# Patient Record
Sex: FEMALE | Race: WHITE | Employment: FULL TIME | URBAN - METROPOLITAN AREA
[De-identification: names, ages, dates, MRNs, and addresses within clinical notes are randomized per-mention and may not be internally consistent; named-entity substitution may affect disease eponyms.]

---

## 2023-02-06 ENCOUNTER — MEDICAL CORRESPONDENCE (OUTPATIENT)
Dept: HEALTH INFORMATION MANAGEMENT | Facility: CLINIC | Age: 38
End: 2023-02-06
Payer: COMMERCIAL

## 2023-02-15 ENCOUNTER — TRANSCRIBE ORDERS (OUTPATIENT)
Dept: OTHER | Age: 38
End: 2023-02-15

## 2023-02-15 DIAGNOSIS — G35 MS (MULTIPLE SCLEROSIS) (H): Primary | ICD-10-CM

## 2023-03-20 ENCOUNTER — OFFICE VISIT (OUTPATIENT)
Dept: NEUROLOGY | Facility: CLINIC | Age: 38
End: 2023-03-20
Payer: COMMERCIAL

## 2023-03-20 VITALS — DIASTOLIC BLOOD PRESSURE: 69 MMHG | SYSTOLIC BLOOD PRESSURE: 109 MMHG | HEART RATE: 76 BPM

## 2023-03-20 DIAGNOSIS — G35 MS (MULTIPLE SCLEROSIS) (H): Primary | ICD-10-CM

## 2023-03-20 DIAGNOSIS — E55.9 VITAMIN D DEFICIENCY: ICD-10-CM

## 2023-03-20 PROCEDURE — 99213 OFFICE O/P EST LOW 20 MIN: CPT | Mod: GC | Performed by: PSYCHIATRY & NEUROLOGY

## 2023-03-20 RX ORDER — DIMETHYL FUMARATE 240 MG/1
1 CAPSULE ORAL 2 TIMES DAILY
COMMUNITY
Start: 2013-02-19 | End: 2024-04-08

## 2023-03-20 RX ORDER — ASPIRIN 81 MG/1
81 TABLET, CHEWABLE ORAL 2 TIMES DAILY
COMMUNITY
Start: 2010-02-19

## 2023-03-20 RX ORDER — PERPHENAZINE 16 MG
TABLET ORAL DAILY
COMMUNITY
End: 2024-04-08

## 2023-03-20 NOTE — NURSING NOTE
Chief Complaint   Patient presents with     MS     Referred by TRICIA Kilgore on 3/20/2023 at 8:10 AM

## 2023-03-20 NOTE — PATIENT INSTRUCTIONS
"Overall you are doing very well with MS     The episode of left arm symptoms could have been a \"pseudo relapse\" a symptom brought on by stress  Your exam still looks great - concern for new lesion is not high     Blood work in June   MRI at that time as well     See me after MRI   "

## 2023-03-20 NOTE — PROGRESS NOTES
Department of Neurology  Multiple Sclerosis   New Patient Visit    Patient: Rita Hardin   MRN: [id number]   : 1985   Date of Visit: 3/20/2023    Chief Complaint: Multiple sclerosis    HPI:  Malini Hardin is a 37-year-old woman with multiple sclerosis who presents to clinic to establish care.    She first developed symptoms in Summer 2008 of tinging in bilateral hands and was ultimately diagnosed in 2008. Since that time, she has not had relapses. Previously, she was followed at [facility] and most recently evaluated in that clinic in 2022.    A couple months ago, she developed chest pain/tightness and unilateral numbness in left hand. This was sudden onset and resolved within 24-36 hours. She went for evaluation and providers evaluated her for cardiac etiologies, but those returned negative. Providers felt that this episode was related to her MS. She notes that she experienced associated fatigue and that the symptoms occurred during a stressful time. She has been following with her PCP to evaluate this and had a cardiac stress test recently.    She has noticed that she has been focusing more on balance. She had one fall a few days ago where she was was at top of stairsm rug moved as she was walking, and fell down some stairs. She did not hit her head or lose consciousness. About one month ago, she fell while shoveling after slipping on ice.    She does not have bowel or bladder incontinence.    Sh is taking dimethyl fumerate, maybe occasionally misses a dose (1-2 times per month). She takes aspirin for flushing associated with the dimethylfumerate. JCV indeterminate in 2022.     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   Last relapse: age 23    DMD History:  Betaseron 2516-9526 (elevated LFTs)  Copaxone/glatiramer acetate 8103-9078 (radiologic progression)  Tecfidera 0690-5903 (flushing, insurance coverage & switched  to generic form)  Dimethyl fumerate (11/2020 to present)    Last evaluated at [facility] on 12/05/2022 by Dr. Saldaña    ROS:  All others negative except as listed above.    PMH/PSH:  Multiple sclerosis  Widsom tooth extraction    Current Medications:  Vitamin D3  Asa  Dimethyl fumerate    Allergies:  No Known Allergies     Family History:  Dad had multiple sclerosis, passed at age 70 with respiratory failure  Mom has leukemia    Social History:  Lives with partner of 6 years. 2 cats, 2 dogs, fish.  Tobacco: none,   Alcohol: rare.  Recreational drugs: none     PHYSICAL EXAM:  /69 (BP Location: Right arm, Patient Position: Sitting)   Pulse 76     Gen: alert, active, attentive, appropriately groomed   HEENT: normocephalic, eyes open with no discharge, nares patent, oropharynx clear.  Pulm: normal configuration, chest rise equal b/l, non labored breathing   CV: extremities appear appropriately perfused  Pulmonary: breathing comfortably on room air, no wheezes/crackles/rales  Extremities: no clubbing/edema/cyanosis in BUE/BLE    NEURO:  MS: Alert and attentive. Oriented to person, place, time, and situation. Follows simple commands, naming intact.  Recent and remote memory intact.      CN:  II: Pupils round and reactive.   III, IV, VI: EOM intact, buries sclera, no nystagmus.   V:  Facial sensation intact and symmetric along V1-V3  VII: Symmetric facial movements  VIII: Intact to conversation.  IX, X: Equal palate rise, uvula midline.  No dysarthria, normal tone.  XI: Shoulder shrug intact bilaterally.  XII: Tongue protrudes midline. No atrophy.    Motor:  Normal bulk and tone throughout upper and lower extremities.  No abnormal movements or fasciculations observed.      Right Left   Shoulder abduction 5 5   Elbow flexion 5 5   Elbow extension 5 5   Wrist extension 5 5   Wrist flexion 5 5   Finger extension 5 5   Hip flexion 5 5   Knee extension 5 5   Knee flexion 5 5   Ankle plantarflexion 5 5   Ankle  dorsiflexion 5 5      Reflexes:     Right Left   Biceps Brisk Brisk   Brachioradialis Brisk Brisk   Triceps Brisk Brisk   Traore     Patellar Brisk Brisk   Achilles Brisk Brisk   Babinski Down Down     Sensation:     Right Left   Cotton/light touch Upper intact  Lower intact Upper intact  Lower intact   Proprioception Upper intact  Lower intact Upper intact  Lower intact   Vibration Upper >15 sec at 1st IP of thumb  Lower >15 sec at 1st IP joint of hallux Upper >15 sec at 1st IP of thumb  Lower >15 sec at 1st IP joint of hallux   Pinprick Upper intact  Lower intact Upper intact  Lower intact     Coordination:  FNF intact bilaterally.    Gait:  Tandem gait intact.  Able to walk on toes and heels.  Romberg negative.    LABS:    IMAGING:  Personally reviewed. The radiologist's interpretation is documented below.    MRI brain, C-, T-spine with and without contrast 12/2/2021 [facility]  IMPRESSION:   HEAD MRI:   1.  Moderate burden of chronic demyelination with supratentorial and infratentorial involvement appears relatively unchanged. This is accompanied by an unchanged mild diffuse parenchymal volume loss.   2.  No superimposed acute intracranial abnormality.     CERVICAL SPINE MRI:   1.  T2 hyperintense chronic demyelinating plaques in the cervical cord, described above, are unchanged in size, number, and overall appearance. No new cord lesions and no abnormal enhancement to suggest active demyelination.   2.  No high-grade spinal canal or neural foraminal stenosis.     THORACIC SPINE MRI:   1.  Unchanged T2 hyperintense cord lesion in the right hemicord at T4 with an unchanged subtle T2 hyperintense cord lesion in the left hemicord at T9-T10. No new cord lesion elsewhere and no abnormal cord enhancement.   2.  No high-grade spinal canal or neural foraminal stenosis.      ASSESSMENT & PLAN:  Rita is a 37-year-old woman who presents for evaluation of multiple sclerosis.     Neurologic examination is  non-focal and appears to be stable compared to prior exams. She is tolerating dimethyl fumerate well and rarely missing doses.     With regard to the episode of left arms sensory changes, it may be related to MS pseudoexcerbation. There are T2/FLAIR hyperintense lesions in the C-spine (right greater than left), which may account for her symptoms, but also note that there are fewer lesions on the left side.     We discussed the indeterminate JVC (0.38) from December 2022, which was obtained though [facility]. For individuals on dimethyl fumerate, the concern for PML raises significantly for low lymphocyte counts. Most recently, her absolute lymphocyte count was 1.8. Counseled continued monitoring.    Plan for brain MRI in June 2023 and continue vitamin D supplementation.    1. MRI brain, C-, T-spine with and without contrast in June 2023  2. JCV, CBC with differential, LFTs in June 2023  3. Continue dimethyl fumerate 240mg twice daily  4. Continue vitamin D 5000U daily  5. Follow-up in 3-4 months    The patient was seen and discussed with the attending neurologist, Dr. Whitt, who agrees with the assessment and plan.    Lisbeth Potts MD  Neurology PGY-4

## 2023-03-20 NOTE — LETTER
3/20/2023         RE: Rita Hardin  [address]  [place] [address]        Dear Colleague,    Thank you for referring your patient, Rita Hardin, to the [facility]OLOG[facility]. Please see a copy of my visit note below.    Department of Neurology  Multiple Sclerosis   New Patient Visit    Patient: Rita Hardin   MRN: [id number]   : 1985   Date of Visit: 3/20/2023    Chief Complaint: Multiple sclerosis    HPI:  Malini Hardin is a 37-year-old woman with multiple sclerosis who presents to clinic to establish care.    She first developed symptoms in Summer 2008 of tinging in bilateral hands and was ultimately diagnosed in 2008. Since that time, she has not had relapses. Previously, she was followed at [facility] and most recently evaluated in that clinic in 2022.    A couple months ago, she developed chest pain/tightness and unilateral numbness in left hand. This was sudden onset and resolved within 24-36 hours. She went for evaluation and providers evaluated her for cardiac etiologies, but those returned negative. Providers felt that this episode was related to her MS. She notes that she experienced associated fatigue and that the symptoms occurred during a stressful time. She has been following with her PCP to evaluate this and had a cardiac stress test recently.    She has noticed that she has been focusing more on balance. She had one fall a few days ago where she was was at top of stairsm rug moved as she was walking, and fell down some stairs. She did not hit her head or lose consciousness. About one month ago, she fell while shoveling after slipping on ice.    She does not have bowel or bladder incontinence.    Sh is taking dimethyl fumerate, maybe occasionally misses a dose (1-2 times per month). She takes aspirin for flushing associated with the dimethylfumerate. JCV indeterminate in 2022.     MS History:  Onset of  symptoms: age 23 years with sensory myelitis, tingling, and Lhermites.   Last relapse: age 23    DMD History:  Betaseron 2363-6218 (elevated LFTs)  Copaxone/glatiramer acetate 5414-1556 (radiologic progression)  Tecfidera 2279-6053 (flushing, insurance coverage & switched to generic form)  Dimethyl fumerate (11/2020 to present)    Last evaluated at [facility] on 12/05/2022 by Dr. Saldaña    ROS:  All others negative except as listed above.    PMH/PSH:  Multiple sclerosis  Widsom tooth extraction    Current Medications:  Vitamin D3  Asa  Dimethyl fumerate    Allergies:  No Known Allergies     Family History:  Dad had multiple sclerosis, passed at age 70 with respiratory failure  Mom has leukemia    Social History:  Lives with partner of 6 years. 2 cats, 2 dogs, fish.  Tobacco: none,   Alcohol: rare.  Recreational drugs: none     PHYSICAL EXAM:  /69 (BP Location: Right arm, Patient Position: Sitting)   Pulse 76     Gen: alert, active, attentive, appropriately groomed   HEENT: normocephalic, eyes open with no discharge, nares patent, oropharynx clear.  Pulm: normal configuration, chest rise equal b/l, non labored breathing   CV: extremities appear appropriately perfused  Pulmonary: breathing comfortably on room air, no wheezes/crackles/rales  Extremities: no clubbing/edema/cyanosis in BUE/BLE    NEURO:  MS: Alert and attentive. Oriented to person, place, time, and situation. Follows simple commands, naming intact.  Recent and remote memory intact.      CN:  II: Pupils round and reactive.   III, IV, VI: EOM intact, buries sclera, no nystagmus.   V:  Facial sensation intact and symmetric along V1-V3  VII: Symmetric facial movements  VIII: Intact to conversation.  IX, X: Equal palate rise, uvula midline.  No dysarthria, normal tone.  XI: Shoulder shrug intact bilaterally.  XII: Tongue protrudes midline. No atrophy.    Motor:  Normal bulk and tone throughout upper and lower extremities.  No abnormal movements or  fasciculations observed.      Right Left   Shoulder abduction 5 5   Elbow flexion 5 5   Elbow extension 5 5   Wrist extension 5 5   Wrist flexion 5 5   Finger extension 5 5   Hip flexion 5 5   Knee extension 5 5   Knee flexion 5 5   Ankle plantarflexion 5 5   Ankle dorsiflexion 5 5      Reflexes:     Right Left   Biceps Brisk Brisk   Brachioradialis Brisk Brisk   Triceps Brisk Brisk   Traore     Patellar Brisk Brisk   Achilles Brisk Brisk   Babinski Down Down     Sensation:     Right Left   Cotton/light touch Upper intact  Lower intact Upper intact  Lower intact   Proprioception Upper intact  Lower intact Upper intact  Lower intact   Vibration Upper >15 sec at 1st IP of thumb  Lower >15 sec at 1st IP joint of hallux Upper >15 sec at 1st IP of thumb  Lower >15 sec at 1st IP joint of hallux   Pinprick Upper intact  Lower intact Upper intact  Lower intact     Coordination:  FNF intact bilaterally.    Gait:  Tandem gait intact.  Able to walk on toes and heels.  Romberg negative.    LABS:    IMAGING:  Personally reviewed. The radiologist's interpretation is documented below.    MRI brain, C-, T-spine with and without contrast 12/2/2021 ([facility]):  IMPRESSION:   HEAD MRI:   1.  Moderate burden of chronic demyelination with supratentorial and infratentorial involvement appears relatively unchanged. This is accompanied by an unchanged mild diffuse parenchymal volume loss.   2.  No superimposed acute intracranial abnormality.     CERVICAL SPINE MRI:   1.  T2 hyperintense chronic demyelinating plaques in the cervical cord, described above, are unchanged in size, number, and overall appearance. No new cord lesions and no abnormal enhancement to suggest active demyelination.   2.  No high-grade spinal canal or neural foraminal stenosis.     THORACIC SPINE MRI:   1.  Unchanged T2 hyperintense cord lesion in the right hemicord at T4 with an unchanged subtle T2 hyperintense cord lesion in the left hemicord at T9-T10. No  new cord lesion elsewhere and no abnormal cord enhancement.   2.  No high-grade spinal canal or neural foraminal stenosis.      ASSESSMENT & PLAN:  Rita is a 37-year-old woman who presents for evaluation of multiple sclerosis.     Neurologic examination is non-focal and appears to be stable compared to prior exams. She is tolerating dimethyl fumerate well and rarely missing doses.     With regard to the episode of left arms sensory changes, it may be related to MS pseudoexcerbation. There are T2/FLAIR hyperintense lesions in the C-spine (right greater than left), which may account for her symptoms, but also note that there are fewer lesions on the left side.     We discussed the indeterminate JVC (0.38) from December 2022, which was obtained though [facility]. For individuals on dimethyl fumerate, the concern for PML raises significantly for low lymphocyte counts. Most recently, her absolute lymphocyte count was 1.8. Counseled continued monitoring.    Plan for brain MRI in June 2023 and continue vitamin D supplementation.    1. MRI brain, C-, T-spine with and without contrast in June 2023  2. JCV, CBC with differential, LFTs in June 2023  3. Continue dimethyl fumerate 240mg twice daily  4. Continue vitamin D 5000U daily  5. Follow-up in 3-4 months    The patient was seen and discussed with the attending neurologist, Dr. Whitt, who agrees with the assessment and plan.    Lisbeth Potts MD  Neurology PGY-4      Attestation signed by Teri Whitt MD at 3/20/2023  8:46 PM:  I personally saw and evaluated Ms. Hardin with Dr. Potts on the date of service.  I have reviewed the above documentation and agree with the findings and recommendations.     A total of 40 minutes were personally spent in the care of this patient on the date of service.     Teri Whitt MD on 3/20/2023 at 8:46 PM        Again, thank you for allowing me to participate in the care of your patient.         Sincerely,        Teri Whitt MD

## 2023-05-21 ENCOUNTER — HEALTH MAINTENANCE LETTER (OUTPATIENT)
Age: 38
End: 2023-05-21

## 2023-06-29 ENCOUNTER — LAB (OUTPATIENT)
Dept: LAB | Facility: HOSPITAL | Age: 38
End: 2023-06-29
Payer: COMMERCIAL

## 2023-06-29 DIAGNOSIS — G35 MS (MULTIPLE SCLEROSIS) (H): ICD-10-CM

## 2023-06-29 DIAGNOSIS — E55.9 VITAMIN D DEFICIENCY: ICD-10-CM

## 2023-06-29 LAB
ALBUMIN SERPL BCG-MCNC: 4.7 G/DL (ref 3.5–5.2)
ALP SERPL-CCNC: 51 U/L (ref 35–104)
ALT SERPL W P-5'-P-CCNC: 11 U/L (ref 0–50)
AST SERPL W P-5'-P-CCNC: 12 U/L (ref 0–45)
BASOPHILS # BLD AUTO: 0 10E3/UL (ref 0–0.2)
BASOPHILS NFR BLD AUTO: 0 %
BILIRUB DIRECT SERPL-MCNC: <0.2 MG/DL (ref 0–0.3)
BILIRUB SERPL-MCNC: 0.3 MG/DL
DEPRECATED CALCIDIOL+CALCIFEROL SERPL-MC: 46 UG/L (ref 20–75)
EOSINOPHIL # BLD AUTO: 0.1 10E3/UL (ref 0–0.7)
EOSINOPHIL NFR BLD AUTO: 1 %
ERYTHROCYTE [DISTWIDTH] IN BLOOD BY AUTOMATED COUNT: 11.8 % (ref 10–15)
HCT VFR BLD AUTO: 38.6 % (ref 35–47)
HGB BLD-MCNC: 13 G/DL (ref 11.7–15.7)
IMM GRANULOCYTES # BLD: 0 10E3/UL
IMM GRANULOCYTES NFR BLD: 0 %
LYMPHOCYTES # BLD AUTO: 1.6 10E3/UL (ref 0.8–5.3)
LYMPHOCYTES NFR BLD AUTO: 30 %
MCH RBC QN AUTO: 30.8 PG (ref 26.5–33)
MCHC RBC AUTO-ENTMCNC: 33.7 G/DL (ref 31.5–36.5)
MCV RBC AUTO: 92 FL (ref 78–100)
MONOCYTES # BLD AUTO: 0.3 10E3/UL (ref 0–1.3)
MONOCYTES NFR BLD AUTO: 5 %
NEUTROPHILS # BLD AUTO: 3.4 10E3/UL (ref 1.6–8.3)
NEUTROPHILS NFR BLD AUTO: 64 %
NRBC # BLD AUTO: 0 10E3/UL
NRBC BLD AUTO-RTO: 0 /100
PLATELET # BLD AUTO: 232 10E3/UL (ref 150–450)
PROT SERPL-MCNC: 7.1 G/DL (ref 6.4–8.3)
RBC # BLD AUTO: 4.22 10E6/UL (ref 3.8–5.2)
WBC # BLD AUTO: 5.4 10E3/UL (ref 4–11)

## 2023-06-29 PROCEDURE — 82306 VITAMIN D 25 HYDROXY: CPT

## 2023-06-29 PROCEDURE — 36415 COLL VENOUS BLD VENIPUNCTURE: CPT

## 2023-06-29 PROCEDURE — 85025 COMPLETE CBC W/AUTO DIFF WBC: CPT

## 2023-06-29 PROCEDURE — 80076 HEPATIC FUNCTION PANEL: CPT

## 2023-07-03 ENCOUNTER — OFFICE VISIT (OUTPATIENT)
Dept: NEUROLOGY | Facility: CLINIC | Age: 38
End: 2023-07-03
Payer: COMMERCIAL

## 2023-07-03 VITALS — HEART RATE: 68 BPM | DIASTOLIC BLOOD PRESSURE: 91 MMHG | SYSTOLIC BLOOD PRESSURE: 120 MMHG

## 2023-07-03 DIAGNOSIS — G35 MS (MULTIPLE SCLEROSIS) (H): Primary | ICD-10-CM

## 2023-07-03 PROCEDURE — 99215 OFFICE O/P EST HI 40 MIN: CPT | Performed by: PSYCHIATRY & NEUROLOGY

## 2023-07-03 NOTE — LETTER
7/3/2023         RE: Rita Hardin  [address]  [place] [address]        Dear Colleague,    Thank you for referring your patient, Rita Hardin, to the [facility] NEUROLOGY CLIN[facility]. Please see a copy of my visit note below.    Date of Service: 7/3/2023    [facility] Neurology   MS Clinic Evaluation    Subjective: 37-year-old woman who presents in follow-up for multiple sclerosis.      She does not report any new symptoms concerning for an MS relapse.  She has been under notable stress both at work and in her personal life.  She is working to make changes, but acknowledges that things will likely become more challenging before they get better.    She continues to take dimethyl fumarate routinely.  She has not experienced any adverse effects from this medication.    She does have questions about immune suppression.    At her last visit she did report some imbalance.  However, she has not had any subsequent observations and is not as concerned about her balance today.    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 23, sensory myelitis, tingling hands and lhermittes  Last relapse: age 23    D3 5000 IU daily    Prior DMDs:   Betaseron 2009 to 2010, discontinued for elevated LFTs  Copaxone 3897-9306, discontinued for radiologic progression  Tecfidera February 2014 to 11/2020 generally well tolerated, though does take aspirin to control flushing side effect  Dimethyl fumarate 11//2020 - present      No Known Allergies    Current Outpatient Medications   Medication     alpha-lipoic acid 600 MG capsule     aspirin (ASA) 81 MG chewable tablet     cholecalciferol 125 MCG (5000 UT) CAPS     dimethyl fumarate 240 MG CPDR     No current facility-administered medications for this visit.        Past medical, surgical, social and family history was personally reviewed. Pertinent details noted above.     Physical Examination:   BP (!) 120/91 (BP Location: Right arm, Patient Position: Sitting)   Pulse 68     General:  no acute distress  Cranial nerves:   VFFC  PERRL w/no RAPD  EOM full w/no ERNESTO   Face symmetric  Hearing intact  No dysarthria   Motor:   Tone is normal   Bulk is normal     R L  Deltoid  5 5  Biceps  5 5  Triceps 5 5  Wrist ext 5 5  Finger ext 5 5  Finger abd 5 5    Hip flexion 5 5  Knee flexion 5 5  Knee ext 5 5  Ankle d/f 5 5    Reflexes: 2+ and symmetric throughout, babinski absent bilaterally  Sensory: vibration is minimally reduced in the toes, JPS normal in the toes   Romberg is absent  Coordination: no ataxia or dysmetria  Gait: normal base and stride, tandem gait is intact, able to balance on one foot and hop x 5 bilaterally    Tests/Imaging:   JEROME virus Ab 0.37 12/2022  Vitamin D 44    MRI brain   2/2018 - personally reviewed, mild to moderate lesion burden, predominantly periventricular, 1-2 juxtacortical lesions, 1 right cerebellar lesion and one left dorsal midbrain lesion, gd not administered  Generally stable when compared to 2016 & 17 but perhaps slight growth in some of the lesions  11/2020- no new lesions, gd-  12/2021 - no new lesions, gd-   6/2023 - no new lesions    MRI cervical spine   4/2019 - dorsal cord lesions at c2 and c4  11/2020 - no new lesions, gd-  12/2021- no new lesions, gd-   6/2023 - ?growth of lesion at c2    MRI thoracic spine   12/2019 - chronic lesions R T4 and left dorsal T9-10  11/2020 - no new lesions, gd-  12/2021 - no new lesions, gd-       Assessment: 37-year-old woman with a longstanding history of multiple sclerosis who has been clinically stable on dimethyl fumarate.  Radiologic surveillance reveals possible growth of the lesion at C2.  MRI images were reviewed with the patient in detail.  Growth is equivocal, but is reported by the radiologist.    We discussed how this change may be significant given that she did report some changes in her balance earlier in the year.    We discussed how it would be reasonable to consider changing disease modifying therapies given her  young age and competitive job.  However, given the blood changes noted on MRI are quite minimal, it would be reasonable to watch and wait.    If she were to switch treatments I would recommend switching to either Ocrevus or kesimpta.  Risks and benefits were discussed in detail.  She will contemplate this option.    Plan:   -Consider switching to Ocrevus or kesimpta  - Continue dimethyl fumarate  - Follow-up in 6 months, though earlier follow-up may be arranged if she has further questions about treatment    Note was completed with the assistance of Dragon Fluency software which can often result in accidental word substitutions.     A total of 40 minutes on the date of service were spent in the care of this patient.   Teri Whitt MD on 7/3/2023 at 9:20 AM          Again, thank you for allowing me to participate in the care of your patient.        Sincerely,        Teri Whitt MD

## 2023-07-03 NOTE — PATIENT INSTRUCTIONS
"Continue dimethyl fumarate    There was a slight change in the lesion in your upper cervical spine     We discussed the option of switching to either kesimpta or ocrevus (rituximab is a \"generic\" option)  These are both highly effective but well tolerated medications   They do cause some immune suppression - but this does not tend to lead to many issues with infections     If you stay on dimethyl fumarate - repeat mri in 1 year     Schedule a follow up in 6 months  "

## 2023-07-03 NOTE — PROGRESS NOTES
Date of Service: 7/3/2023    [facility] Neurology   MS Clinic Evaluation    Subjective: 37-year-old woman who presents in follow-up for multiple sclerosis.      She does not report any new symptoms concerning for an MS relapse.  She has been under notable stress both at work and in her personal life.  She is working to make changes, but acknowledges that things will likely become more challenging before they get better.    She continues to take dimethyl fumarate routinely.  She has not experienced any adverse effects from this medication.    She does have questions about immune suppression.    At her last visit she did report some imbalance.  However, she has not had any subsequent observations and is not as concerned about her balance today.    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 23, sensory myelitis, tingling hands and lhermittes  Last relapse: age 23    D3 5000 IU daily    Prior DMDs:   Betaseron 2009 to 2010, discontinued for elevated LFTs  Copaxone 4802-6132, discontinued for radiologic progression  Tecfidera February 2014 to 11/2020 generally well tolerated, though does take aspirin to control flushing side effect  Dimethyl fumarate 11//2020 - present      No Known Allergies    Current Outpatient Medications   Medication     alpha-lipoic acid 600 MG capsule     aspirin (ASA) 81 MG chewable tablet     cholecalciferol 125 MCG (5000 UT) CAPS     dimethyl fumarate 240 MG CPDR     No current facility-administered medications for this visit.        Past medical, surgical, social and family history was personally reviewed. Pertinent details noted above.     Physical Examination:   BP (!) 120/91 (BP Location: Right arm, Patient Position: Sitting)   Pulse 68     General: no acute distress  Cranial nerves:   VFFC  PERRL w/no RAPD  EOM full w/no ERNESTO   Face symmetric  Hearing intact  No dysarthria   Motor:   Tone is normal   Bulk is normal     R L  Deltoid  5 5  Biceps  5 5  Triceps 5 5  Wrist ext 5 5  Finger ext 5 5  Finger abd 5 5    Hip  flexion 5 5  Knee flexion 5 5  Knee ext 5 5  Ankle d/f 5 5    Reflexes: 2+ and symmetric throughout, babinski absent bilaterally  Sensory: vibration is minimally reduced in the toes, JPS normal in the toes   Romberg is absent  Coordination: no ataxia or dysmetria  Gait: normal base and stride, tandem gait is intact, able to balance on one foot and hop x 5 bilaterally    Tests/Imaging:   JEROME virus Ab 0.37 12/2022  Vitamin D 44    MRI brain   2/2018 - personally reviewed, mild to moderate lesion burden, predominantly periventricular, 1-2 juxtacortical lesions, 1 right cerebellar lesion and one left dorsal midbrain lesion, gd not administered  Generally stable when compared to 2016 & 17 but perhaps slight growth in some of the lesions  11/2020- no new lesions, gd-  12/2021 - no new lesions, gd-   6/2023 - no new lesions    MRI cervical spine   4/2019 - dorsal cord lesions at c2 and c4  11/2020 - no new lesions, gd-  12/2021- no new lesions, gd-   6/2023 - ?growth of lesion at c2    MRI thoracic spine   12/2019 - chronic lesions R T4 and left dorsal T9-10  11/2020 - no new lesions, gd-  12/2021 - no new lesions, gd-       Assessment: 37-year-old woman with a longstanding history of multiple sclerosis who has been clinically stable on dimethyl fumarate.  Radiologic surveillance reveals possible growth of the lesion at C2.  MRI images were reviewed with the patient in detail.  Growth is equivocal, but is reported by the radiologist.    We discussed how this change may be significant given that she did report some changes in her balance earlier in the year.    We discussed how it would be reasonable to consider changing disease modifying therapies given her young age and competitive job.  However, given the blood changes noted on MRI are quite minimal, it would be reasonable to watch and wait.    If she were to switch treatments I would recommend switching to either Ocrevus or kesimpta.  Risks and benefits were discussed in  detail.  She will contemplate this option.    Plan:   -Consider switching to Ocrevus or kesimpta  - Continue dimethyl fumarate  - Follow-up in 6 months, though earlier follow-up may be arranged if she has further questions about treatment    Note was completed with the assistance of Dragon Fluency software which can often result in accidental word substitutions.     A total of 40 minutes on the date of service were spent in the care of this patient.   Teri Whitt MD on 7/3/2023 at 9:20 AM

## 2023-07-05 LAB — SCANNED LAB RESULT: NORMAL

## 2023-08-21 ENCOUNTER — MYC MEDICAL ADVICE (OUTPATIENT)
Dept: NEUROLOGY | Facility: CLINIC | Age: 38
End: 2023-08-21
Payer: COMMERCIAL

## 2023-08-21 DIAGNOSIS — G35 MS (MULTIPLE SCLEROSIS) (H): Primary | ICD-10-CM

## 2023-08-21 DIAGNOSIS — Z51.81 THERAPEUTIC DRUG MONITORING: ICD-10-CM

## 2023-08-21 NOTE — TELEPHONE ENCOUNTER
Please advise on [employer] message below.     Thank you!    Lisa Scott MA on 8/21/2023 at 10:30 AM

## 2023-08-23 ENCOUNTER — MYC MEDICAL ADVICE (OUTPATIENT)
Dept: NEUROLOGY | Facility: CLINIC | Age: 38
End: 2023-08-23
Payer: COMMERCIAL

## 2023-08-24 ENCOUNTER — TELEPHONE (OUTPATIENT)
Dept: NEUROLOGY | Facility: CLINIC | Age: 38
End: 2023-08-24
Payer: COMMERCIAL

## 2023-08-24 DIAGNOSIS — G35 MS (MULTIPLE SCLEROSIS) (H): Primary | ICD-10-CM

## 2023-08-24 NOTE — TELEPHONE ENCOUNTER
Dr. Whitt,     Please place order in for Kesimpta. Please let me know when the order is in. Thank you.      TRICIA Ochoa on 8/24/2023 at 2:55 PM

## 2023-08-24 NOTE — TELEPHONE ENCOUNTER
FMLA forms was printed and given to Dr. Whitt.    Kesimpta form was also given to Dr. Whitt to sign.    TRICIA Ochoa on 8/24/2023 at 9:07 AM

## 2023-08-25 RX ORDER — OFATUMUMAB 20 MG/.4ML
20 INJECTION, SOLUTION SUBCUTANEOUS WEEKLY
Qty: 1.2 ML | Refills: 0 | Status: SHIPPED | OUTPATIENT
Start: 2023-08-25 | End: 2023-09-09

## 2023-08-25 RX ORDER — OFATUMUMAB 20 MG/.4ML
20 INJECTION, SOLUTION SUBCUTANEOUS
Qty: 0.4 ML | Refills: 11 | Status: SHIPPED | OUTPATIENT
Start: 2023-08-25 | End: 2024-01-08

## 2023-08-25 NOTE — TELEPHONE ENCOUNTER
PA Initiation    Medication: KESIMPTA 20 MG/0.4ML SC SOAJ  Insurance Company: [employer] - Phone [phone] Fax [phone]  Pharmacy Filling the Rx:    Filling Pharmacy Phone:    Filling Pharmacy Fax:    Start Date: 8/25/2023

## 2023-08-25 NOTE — TELEPHONE ENCOUNTER
Prior Authorization Specialty Medication Request    Medication/Dose: ofatumumab (KESIMPTA) 20 MG/0.4ML injection   ICD code (if different than what is on RX):  G35  Previously Tried and Failed:  Betaseron 2009 to 2010, discontinued for elevated LFTs  Copaxone 5409-5248, discontinued for radiologic progression  Tecfidera February 2014 to 11/2020 generally well tolerated, though does take aspirin to control flushing side effect  Dimethyl fumarate 11//2020 - present     Important Lab Values:   Rationale: We discussed how it would be reasonable to consider changing disease modifying therapies given her young age and competitive job.  However, given the blood changes noted on MRI are quite minimal, it would be reasonable to watch and wait.     If she were to switch treatments I would recommend switching to either Ocrevus or kesimpta.  Risks and benefits were discussed in detail.  She will contemplate this option.    Insurance Name:   Insurance ID:   Insurance Phone Number:     Pharmacy Information (if different than what is on RX)  Name:    Phone:

## 2023-08-29 ENCOUNTER — TELEPHONE (OUTPATIENT)
Dept: NEUROLOGY | Facility: CLINIC | Age: 38
End: 2023-08-29
Payer: COMMERCIAL

## 2023-08-29 NOTE — TELEPHONE ENCOUNTER
Spoke to Elsa, from Kesimpta. Per Elsa the Start Form was missing the diagnoses code.   I was going to provide the diagnoses code but was told that they do not accept the diagnoses code over the phone. Per Elsa asked to fax the Start Form.    The Form scanned in was missing pts signature so I didn't faxed the Start Form.    If you could please help with this.    Thank you,  Lisa Scott MA on 8/29/2023 at 2:21 PM

## 2023-08-29 NOTE — TELEPHONE ENCOUNTER
[facility] Call Center    Phone Message    May a detailed message be left on voicemail: yes     Reason for Call: Caller Stated the start form was received but it had no diagnosis order number.     Please call Mary Ann with Diagnosis order number  @ [phone]    Action Taken: Message routed to:  Other: [facility]    Travel Screening: Not Applicable

## 2023-08-29 NOTE — TELEPHONE ENCOUNTER
Prior Authorization Approval    Medication: KESIMPTA 20 MG/0.4ML SC SOAJ  Authorization Effective Date: 8/25/2023  Authorization Expiration Date: 8/25/2024  Approved Dose/Quantity: 28 days  Reference #: Key: [id number]   Insurance Company: [employer] - Phone [phone] Fax [phone]  Expected CoPay:       CoPay Card Available:      Financial Assistance Needed: Alongside Kesimpta  Which [facility] is filling the prescription[facility] [address] [place] [address] [facility]  Pharmacy Notified:    Patient Notified:        LOADING DOSE PA IS EFFECTIVE UNTIL 9/9/23.        Thank you,    Cristy Rivas [facility]  Specialty Pharmacy Clinic Liaison - CardiologyNeurologyMultiple Sclerosis  [facility]  [address] [place] [address] [place] [address]  Ph: [phone] Fax: [phone]  [email or web address]

## 2023-09-01 ENCOUNTER — LAB (OUTPATIENT)
Dept: LAB | Facility: HOSPITAL | Age: 38
End: 2023-09-01
Payer: COMMERCIAL

## 2023-09-01 DIAGNOSIS — G35 MS (MULTIPLE SCLEROSIS) (H): ICD-10-CM

## 2023-09-01 DIAGNOSIS — Z51.81 THERAPEUTIC DRUG MONITORING: ICD-10-CM

## 2023-09-01 LAB
BASOPHILS # BLD AUTO: 0 10E3/UL (ref 0–0.2)
BASOPHILS NFR BLD AUTO: 0 %
EOSINOPHIL # BLD AUTO: 0 10E3/UL (ref 0–0.7)
EOSINOPHIL NFR BLD AUTO: 0 %
ERYTHROCYTE [DISTWIDTH] IN BLOOD BY AUTOMATED COUNT: 11.8 % (ref 10–15)
HCT VFR BLD AUTO: 39.8 % (ref 35–47)
HGB BLD-MCNC: 13.6 G/DL (ref 11.7–15.7)
IMM GRANULOCYTES # BLD: 0 10E3/UL
IMM GRANULOCYTES NFR BLD: 0 %
LYMPHOCYTES # BLD AUTO: 2.2 10E3/UL (ref 0.8–5.3)
LYMPHOCYTES NFR BLD AUTO: 24 %
MCH RBC QN AUTO: 30.5 PG (ref 26.5–33)
MCHC RBC AUTO-ENTMCNC: 34.2 G/DL (ref 31.5–36.5)
MCV RBC AUTO: 89 FL (ref 78–100)
MONOCYTES # BLD AUTO: 0.4 10E3/UL (ref 0–1.3)
MONOCYTES NFR BLD AUTO: 4 %
NEUTROPHILS # BLD AUTO: 6.4 10E3/UL (ref 1.6–8.3)
NEUTROPHILS NFR BLD AUTO: 72 %
NRBC # BLD AUTO: 0 10E3/UL
NRBC BLD AUTO-RTO: 0 /100
PLATELET # BLD AUTO: 245 10E3/UL (ref 150–450)
RBC # BLD AUTO: 4.46 10E6/UL (ref 3.8–5.2)
WBC # BLD AUTO: 9 10E3/UL (ref 4–11)

## 2023-09-01 PROCEDURE — 85025 COMPLETE CBC W/AUTO DIFF WBC: CPT

## 2023-09-01 PROCEDURE — 86787 VARICELLA-ZOSTER ANTIBODY: CPT

## 2023-09-01 PROCEDURE — 82784 ASSAY IGA/IGD/IGG/IGM EACH: CPT

## 2023-09-01 PROCEDURE — 87340 HEPATITIS B SURFACE AG IA: CPT

## 2023-09-01 PROCEDURE — 86704 HEP B CORE ANTIBODY TOTAL: CPT

## 2023-09-01 PROCEDURE — 36415 COLL VENOUS BLD VENIPUNCTURE: CPT

## 2023-09-01 PROCEDURE — 86706 HEP B SURFACE ANTIBODY: CPT

## 2023-09-02 LAB
HBV CORE AB SERPL QL IA: NONREACTIVE
HBV SURFACE AB SERPL IA-ACNC: 539 M[IU]/ML
HBV SURFACE AB SERPL IA-ACNC: REACTIVE M[IU]/ML
HBV SURFACE AG SERPL QL IA: NONREACTIVE

## 2023-09-04 LAB
VZV IGG SER QL IA: 467.6 INDEX
VZV IGG SER QL IA: POSITIVE

## 2023-09-05 LAB
IGA SERPL-MCNC: 173 MG/DL (ref 84–499)
IGG SERPL-MCNC: 790 MG/DL (ref 610–1616)
IGM SERPL-MCNC: 267 MG/DL (ref 35–242)

## 2023-09-07 NOTE — TELEPHONE ENCOUNTER
Dr. Whitt,    Form is in you folder in your office. Once completed, you can give it back to me to email it to the pt. Thank you.      TRICIA Ochoa on 9/7/2023 at 9:20 AM

## 2024-01-05 ENCOUNTER — TELEPHONE (OUTPATIENT)
Dept: NEUROLOGY | Facility: CLINIC | Age: 39
End: 2024-01-05
Payer: COMMERCIAL

## 2024-01-05 DIAGNOSIS — G35 MS (MULTIPLE SCLEROSIS) (H): ICD-10-CM

## 2024-01-05 NOTE — TELEPHONE ENCOUNTER
[facility] Call Center    Phone Message    May a detailed message be left on voicemail: yes     Reason for Call: Medication Refill Request    Has the patient contacted the pharmacy for the refill? Yes   Name of medication being requested: ofatumumab (KESIMPTA) 20 MG/0.4ML injection  Provider who prescribed the medication: Teri Whitt  Pharmacy: [address]  Date medication is needed: ASAP     Pt is requesting the prior authorization to be submitted for the medication listed above.     Please call pt back to advise at # [phone].    Action Taken: Message routed to:  Other: [facility]     Travel Screening: Not Applicable

## 2024-01-05 NOTE — TELEPHONE ENCOUNTER
Called and left message for patient, to confirm need for PA, or if new Rx needs to be sent to pharmacy. Would like to clarify when patient last had this medication refilled. Chart shows loading dose and maintenance ordered/sent 8/29/23, but only notes 1 time dispense.    Please confirm if patient has been taking monthly maintenance dose, prior to sending in new Rx.    Stu Carrillo RN, BSN  [facility]

## 2024-01-08 ENCOUNTER — LAB (OUTPATIENT)
Dept: LAB | Facility: HOSPITAL | Age: 39
End: 2024-01-08
Payer: COMMERCIAL

## 2024-01-08 ENCOUNTER — OFFICE VISIT (OUTPATIENT)
Dept: NEUROLOGY | Facility: CLINIC | Age: 39
End: 2024-01-08
Payer: COMMERCIAL

## 2024-01-08 VITALS — HEART RATE: 82 BPM | DIASTOLIC BLOOD PRESSURE: 73 MMHG | SYSTOLIC BLOOD PRESSURE: 112 MMHG

## 2024-01-08 DIAGNOSIS — G35 MS (MULTIPLE SCLEROSIS) (H): ICD-10-CM

## 2024-01-08 DIAGNOSIS — G35 MS (MULTIPLE SCLEROSIS) (H): Primary | ICD-10-CM

## 2024-01-08 LAB
BASOPHILS # BLD AUTO: 0 10E3/UL (ref 0–0.2)
BASOPHILS NFR BLD AUTO: 0 %
EOSINOPHIL # BLD AUTO: 0 10E3/UL (ref 0–0.7)
EOSINOPHIL NFR BLD AUTO: 1 %
ERYTHROCYTE [DISTWIDTH] IN BLOOD BY AUTOMATED COUNT: 12.5 % (ref 10–15)
HCT VFR BLD AUTO: 37.3 % (ref 35–47)
HGB BLD-MCNC: 12.6 G/DL (ref 11.7–15.7)
IMM GRANULOCYTES # BLD: 0 10E3/UL
IMM GRANULOCYTES NFR BLD: 0 %
LYMPHOCYTES # BLD AUTO: 1.5 10E3/UL (ref 0.8–5.3)
LYMPHOCYTES NFR BLD AUTO: 22 %
MCH RBC QN AUTO: 30.3 PG (ref 26.5–33)
MCHC RBC AUTO-ENTMCNC: 33.8 G/DL (ref 31.5–36.5)
MCV RBC AUTO: 90 FL (ref 78–100)
MONOCYTES # BLD AUTO: 0.3 10E3/UL (ref 0–1.3)
MONOCYTES NFR BLD AUTO: 5 %
NEUTROPHILS # BLD AUTO: 4.9 10E3/UL (ref 1.6–8.3)
NEUTROPHILS NFR BLD AUTO: 72 %
NRBC # BLD AUTO: 0 10E3/UL
NRBC BLD AUTO-RTO: 0 /100
PLATELET # BLD AUTO: 223 10E3/UL (ref 150–450)
RBC # BLD AUTO: 4.16 10E6/UL (ref 3.8–5.2)
WBC # BLD AUTO: 6.8 10E3/UL (ref 4–11)

## 2024-01-08 PROCEDURE — 85041 AUTOMATED RBC COUNT: CPT

## 2024-01-08 PROCEDURE — 99214 OFFICE O/P EST MOD 30 MIN: CPT | Performed by: PSYCHIATRY & NEUROLOGY

## 2024-01-08 PROCEDURE — 36415 COLL VENOUS BLD VENIPUNCTURE: CPT

## 2024-01-08 RX ORDER — OFATUMUMAB 20 MG/.4ML
20 INJECTION, SOLUTION SUBCUTANEOUS
Qty: 0.4 ML | Refills: 11 | Status: SHIPPED | OUTPATIENT
Start: 2024-01-08

## 2024-01-08 NOTE — TELEPHONE ENCOUNTER
Signed Prescriptions:                        Disp   Refills    ofatumumab (KESIMPTA) 20 MG/0.4ML injection0.4 mL 11       Sig: Inject 0.4 mLs (20 mg) Subcutaneous every 28           (twenty-eight) days  Authorizing Provider: ARELI SY  Ordering User: HARRIETT CRANE    Resdavid RX as advised per PA Team recommendations, medication approved.    Cristy Rivas  The PA is still good for the Kesimpta through 8/25/24. Looks like they just need a new RX.          Harriett Crane, RN, BSN  [facility]

## 2024-01-08 NOTE — PATIENT INSTRUCTIONS
Proceed with kesimpta     Blood count today     Do kesimpta injection on a Friday night   Take 1000 mg tylenol at least 30 minutes before the injection   Make sure to drink a lot of water the day of your first three injections   If you struggle with headache or low grade fever after the injection, you can continue to take 1000 mg tylenol every 8 hours for 24-48 hours     Follow up in 3 months

## 2024-01-08 NOTE — PROGRESS NOTES
Date of Service: 2024    [facility] Neurology   MS Clinic Evaluation    Subjective: 38-year-old woman who presents in follow-up for multiple sclerosis.      She had intended to start kesimpta.    She admits that she had anxiety starting this medication.  Then in November she had a significant upper respiratory tract illness that took approximately 3 weeks to get over.    She did decide that she is ready to start kesimpta.  However, when she called the pharmacy to get it filled she was not able to get it sent to her.  She was told that the PA has .    She does to share her fears about starting this medication.    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 23, sensory myelitis, tingling hands and lhermittes  Last relapse: age 23    D3 5000 IU daily    Prior DMDs:   Betaseron  to , discontinued for elevated LFTs  Copaxone 4235-9969, discontinued for radiologic progression  Tecfidera 2014 to 2020 generally well tolerated, though does take aspirin to control flushing side effect  Dimethyl fumarate 2020 - 2023, radiologic progression (growth of lesion at C2)      No Known Allergies    Current Outpatient Medications   Medication    alpha-lipoic acid 600 MG capsule    aspirin (ASA) 81 MG chewable tablet    cholecalciferol 125 MCG (5000 UT) CAPS    dimethyl fumarate 240 MG CPDR    ofatumumab (KESIMPTA) 20 MG/0.4ML injection     No current facility-administered medications for this visit.        Past medical, surgical, social and family history was personally reviewed. Pertinent details noted above.     Physical Examination:   /73 (BP Location: Right arm, Patient Position: Sitting, Cuff Size: Adult Large)   Pulse 82     General: no acute distress      Tests/Imaging:   JEROME virus Ab neg   Vitamin D 44    MRI brain   2018 - personally reviewed, mild to moderate lesion burden, predominantly periventricular, 1-2 juxtacortical lesions, 1 right cerebellar lesion and one left dorsal midbrain lesion, gd not  administered  Generally stable when compared to 2016 & 17 but perhaps slight growth in some of the lesions  11/2020- no new lesions, gd-  12/2021 - no new lesions, gd-   6/2023 - no new lesions    MRI cervical spine   4/2019 - dorsal cord lesions at c2 and c4  11/2020 - no new lesions, gd-  12/2021- no new lesions, gd-   6/2023 - ?growth of lesion at c2    MRI thoracic spine   12/2019 - chronic lesions R T4 and left dorsal T9-10  11/2020 - no new lesions, gd-  12/2021 - no new lesions, gd-       Assessment: 38-year-old woman with a longstanding history of multiple sclerosis who has been clinically stable on dimethyl fumarate.  Radiologic surveillance reveals possible growth of the lesion at C2.  I had recommended switching disease modifying therapies.  She has yet to start kesimpta.  We addressed her concerns today.  After discussion she is agreeable to proceeding with the medication.    Plan:   -Proceed with kesimpta  - CBC today  - Follow-up in 3 months    Note was completed with the assistance of Dragon Fluency software which can often result in accidental word substitutions.     A total of 30 minutes on the date of service were spent in the care of this patient.   Teri Whitt MD on 7/3/2023 at 9:20 AM

## 2024-01-08 NOTE — LETTER
2024         RE: Rita Hardin  [address]  [place] [address]        Dear Colleague,    Thank you for referring your patient, Rita Hardin, to the [facility] NEUROLOGY [facility]IC [facility]. Please see a copy of my visit note below.    Date of Service: 2024    [facility] Neurology   MS Clinic Evaluation    Subjective: 38-year-old woman who presents in follow-up for multiple sclerosis.      She had intended to start kesimpta.    She admits that she had anxiety starting this medication.  Then in November she had a significant upper respiratory tract illness that took approximately 3 weeks to get over.    She did decide that she is ready to start kesimpta.  However, when she called the pharmacy to get it filled she was not able to get it sent to her.  She was told that the PA has .    She does to share her fears about starting this medication.    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 23, sensory myelitis, tingling hands and lhermittes  Last relapse: age 23    D3 5000 IU daily    Prior DMDs:   Betaseron  to , discontinued for elevated LFTs  Copaxone 6173-4671, discontinued for radiologic progression  Tecfidera 2014 to 2020 generally well tolerated, though does take aspirin to control flushing side effect  Dimethyl fumarate 2020 - 2023, radiologic progression (growth of lesion at C2)      No Known Allergies    Current Outpatient Medications   Medication     alpha-lipoic acid 600 MG capsule     aspirin (ASA) 81 MG chewable tablet     cholecalciferol 125 MCG (5000 UT) CAPS     dimethyl fumarate 240 MG CPDR     ofatumumab (KESIMPTA) 20 MG/0.4ML injection     No current facility-administered medications for this visit.        Past medical, surgical, social and family history was personally reviewed. Pertinent details noted above.     Physical Examination:   /73 (BP Location: Right arm, Patient Position: Sitting, Cuff Size: Adult Large)   Pulse 82     General: no acute  distress      Tests/Imaging:   JEROME virus Ab neg   Vitamin D 44    MRI brain   2/2018 - personally reviewed, mild to moderate lesion burden, predominantly periventricular, 1-2 juxtacortical lesions, 1 right cerebellar lesion and one left dorsal midbrain lesion, gd not administered  Generally stable when compared to 2016 & 17 but perhaps slight growth in some of the lesions  11/2020- no new lesions, gd-  12/2021 - no new lesions, gd-   6/2023 - no new lesions    MRI cervical spine   4/2019 - dorsal cord lesions at c2 and c4  11/2020 - no new lesions, gd-  12/2021- no new lesions, gd-   6/2023 - ?growth of lesion at c2    MRI thoracic spine   12/2019 - chronic lesions R T4 and left dorsal T9-10  11/2020 - no new lesions, gd-  12/2021 - no new lesions, gd-       Assessment: 38-year-old woman with a longstanding history of multiple sclerosis who has been clinically stable on dimethyl fumarate.  Radiologic surveillance reveals possible growth of the lesion at C2.  I had recommended switching disease modifying therapies.  She has yet to start kesimpta.  We addressed her concerns today.  After discussion she is agreeable to proceeding with the medication.    Plan:   -Proceed with kesimpta  - CBC today  - Follow-up in 3 months    Note was completed with the assistance of Dragon Fluency software which can often result in accidental word substitutions.     A total of 30 minutes on the date of service were spent in the care of this patient.   Teri Whitt MD on 7/3/2023 at 9:20 AM        Again, thank you for allowing me to participate in the care of your patient.        Sincerely,        Teri Whitt MD

## 2024-04-08 ENCOUNTER — LAB (OUTPATIENT)
Dept: LAB | Facility: CLINIC | Age: 39
End: 2024-04-08
Payer: COMMERCIAL

## 2024-04-08 ENCOUNTER — OFFICE VISIT (OUTPATIENT)
Dept: NEUROLOGY | Facility: CLINIC | Age: 39
End: 2024-04-08
Payer: COMMERCIAL

## 2024-04-08 VITALS — DIASTOLIC BLOOD PRESSURE: 77 MMHG | HEART RATE: 68 BPM | SYSTOLIC BLOOD PRESSURE: 128 MMHG

## 2024-04-08 DIAGNOSIS — G35 MS (MULTIPLE SCLEROSIS) (H): ICD-10-CM

## 2024-04-08 DIAGNOSIS — Z51.81 THERAPEUTIC DRUG MONITORING: ICD-10-CM

## 2024-04-08 DIAGNOSIS — G35 MS (MULTIPLE SCLEROSIS) (H): Primary | ICD-10-CM

## 2024-04-08 LAB
BASOPHILS # BLD AUTO: 0 10E3/UL (ref 0–0.2)
BASOPHILS NFR BLD AUTO: 0 %
CD19 B CELL COMMENT: ABNORMAL
CD19 CELLS # BLD: <1 CELLS/UL (ref 107–698)
CD19 CELLS NFR BLD: <1 % (ref 6–27)
EOSINOPHIL # BLD AUTO: 0 10E3/UL (ref 0–0.7)
EOSINOPHIL NFR BLD AUTO: 0 %
ERYTHROCYTE [DISTWIDTH] IN BLOOD BY AUTOMATED COUNT: 12 % (ref 10–15)
HCT VFR BLD AUTO: 41.4 % (ref 35–47)
HGB BLD-MCNC: 14.4 G/DL (ref 11.7–15.7)
IMM GRANULOCYTES # BLD: 0 10E3/UL
IMM GRANULOCYTES NFR BLD: 0 %
LYMPHOCYTES # BLD AUTO: 1.5 10E3/UL (ref 0.8–5.3)
LYMPHOCYTES NFR BLD AUTO: 16 %
MCH RBC QN AUTO: 30.9 PG (ref 26.5–33)
MCHC RBC AUTO-ENTMCNC: 34.8 G/DL (ref 31.5–36.5)
MCV RBC AUTO: 89 FL (ref 78–100)
MONOCYTES # BLD AUTO: 0.5 10E3/UL (ref 0–1.3)
MONOCYTES NFR BLD AUTO: 5 %
NEUTROPHILS # BLD AUTO: 7.6 10E3/UL (ref 1.6–8.3)
NEUTROPHILS NFR BLD AUTO: 79 %
NRBC # BLD AUTO: 0 10E3/UL
NRBC BLD AUTO-RTO: 0 /100
PLATELET # BLD AUTO: 269 10E3/UL (ref 150–450)
RBC # BLD AUTO: 4.66 10E6/UL (ref 3.8–5.2)
WBC # BLD AUTO: 9.6 10E3/UL (ref 4–11)

## 2024-04-08 PROCEDURE — 86355 B CELLS TOTAL COUNT: CPT

## 2024-04-08 PROCEDURE — 99214 OFFICE O/P EST MOD 30 MIN: CPT | Performed by: PSYCHIATRY & NEUROLOGY

## 2024-04-08 PROCEDURE — 85025 COMPLETE CBC W/AUTO DIFF WBC: CPT

## 2024-04-08 PROCEDURE — 36415 COLL VENOUS BLD VENIPUNCTURE: CPT

## 2024-04-08 PROCEDURE — 82784 ASSAY IGA/IGD/IGG/IGM EACH: CPT

## 2024-04-08 NOTE — PROGRESS NOTES
Date of Service: 4/8/2024    [facility] Neurology   MS Clinic Evaluation    Subjective: 38-year-old woman who presents in follow-up for multiple sclerosis.      She does not report any discrete new symptoms related to multiple sclerosis.    She did start kesimpta in January.  With the first injection she did experience some tingling around her lips and a couple episodes of diarrhea shortly after the injection.  She reached out to the clinic and did end up starting to take Benadryl before her injection in addition to the Tylenol.  Her most recent injection was administered on March 17.  Subsequent to that she has had recurrent spells of chest pain.  She also notes that since starting the stroke she has had multiple panic attacks.  She did present to the ER for one of the spells of chest pain.  Reassurance was provided that there was no evidence of a heart attack, though no workup was really performed.  She followed up with primary care who has ordered a cardiac workup.    She does report a couple episodes of dizziness, but generally feels like her balance is better.    The chest pain is described as a dull left-sided mid chest pain sometimes it will radiate into the left arm but when it does this she mostly notices symptoms in her fingers.  She has had a couple episodes of a deep internal squeezing sensation.    She does note that the injections that she did throughout the month of February went okay without significant adverse effect.    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 23, sensory myelitis, tingling hands and lhermittes  Last relapse: age 23    D3 5000 IU daily    Prior DMDs:   Betaseron 2009 to 2010, discontinued for elevated LFTs  Copaxone 6216-9048, discontinued for radiologic progression  Tecfidera February 2014 to 11/2020 generally well tolerated, though does take aspirin to control flushing side effect  Dimethyl fumarate 11/2020 - 7/2023, radiologic progression (growth of lesion at C2)  Kesimpta January 2024 to  present      No Known Allergies    Current Outpatient Medications   Medication Sig Dispense Refill    aspirin (ASA) 81 MG chewable tablet Take 81 mg by mouth 2 times daily      cholecalciferol 125 MCG (5000 UT) CAPS Take 5,000 Units by mouth daily      ofatumumab (KESIMPTA) 20 MG/0.4ML injection Inject 0.4 mLs (20 mg) Subcutaneous every 28 (twenty-eight) days 0.4 mL 11    alpha-lipoic acid 600 MG capsule Take by mouth daily (Patient not taking: Reported on 4/8/2024)      dimethyl fumarate 240 MG CPDR Take 1 capsule by mouth 2 times daily       No current facility-administered medications for this visit.        Past medical, surgical, social and family history was personally reviewed. Pertinent details noted above.     Physical Examination:   /77 (BP Location: Right arm, Patient Position: Sitting, Cuff Size: Adult Regular)   Pulse 68     General: no acute distress  Cranial nerves:   VFFC  PERRL w/no RAPD  EOM full w/no ERNESTO   Face symmetric  Hearing intact  No dysarthria   Motor:   Tone is normal   Bulk is normal                           R          L  Deltoid             5          5  Biceps             5          5  Triceps            5          5  Wrist ext          5          5  Finger ext        5          5  Finger abd       5          5     Hip flexion       5          5  Knee flexion    5          5  Knee ext          5          5  Ankle d/f          5          5     Reflexes: 2+ and symmetric throughout, babinski absent bilaterally  Sensory: vibration is minimally reduced in the toes, JPS normal in the toes   Romberg is absent  Coordination: no ataxia or dysmetria  Gait: normal base and stride, tandem gait is intact, able to balance on one foot and hop x 5 bilaterally, able to get up from chair with a single leg    Tests/Imaging:   JEROME virus Ab 0.37  Vitamin D 46    MRI brain   2/2018 - personally reviewed, mild to moderate lesion burden, predominantly periventricular, 1-2 juxtacortical lesions, 1 right  cerebellar lesion and one left dorsal midbrain lesion, gd not administered  Generally stable when compared to 2016 & 17 but perhaps slight growth in some of the lesions  11/2020- no new lesions, gd-  12/2021 - no new lesions, gd-   6/2023 - no new lesions    MRI cervical spine   4/2019 - dorsal cord lesions at c2 and c4  11/2020 - no new lesions, gd-  12/2021- no new lesions, gd-   6/2023 - ?growth of lesion at c2    MRI thoracic spine   12/2019 - chronic lesions R T4 and left dorsal T9-10  11/2020 - no new lesions, gd-  12/2021 - no new lesions, gd-       Assessment: 38-year-old woman with a longstanding history of multiple sclerosis who had evidence of growth of the lesion at C2 while on dimethyl fumarate.  She is currently transitioning to kesimpta, but is struggling with side effects.    I explained to her that I do not consider her an anxious person, but I do have concerns that this medication is anxiety working for her.  We explored a couple of ways in which this medication could be provoking anxiety for her.    I recommended that she continue with kesimpta, but continue to track her chest pain episodes.  This was helpful to determine the relation of the chest pain spells with her injection.    We briefly discussed treatment alternatives if she is not able to continue with kesimpta.    We discussed how management of multiple sclerosis has changed since she was diagnosed.  Rationale was also reviewed.    Plan:   -Continue kesimpta  - Blood work today  - Follow-up in 3 months    Note was completed with the assistance of Dragon Fluency software which can often result in accidental word substitutions.     A total of 30 minutes on the date of service were spent in the care of this patient.   Teri Whitt MD on 4/8/2024 at 9:55 AM

## 2024-04-08 NOTE — PATIENT INSTRUCTIONS
Continue kesimpta     Log the chest pain reactions     Blood work today     Follow up in 3 months

## 2024-04-09 LAB — IGG SERPL-MCNC: 820 MG/DL (ref 610–1616)

## 2024-06-20 NOTE — NURSING NOTE
Chief Complaint   Patient presents with     Follow Up     Return MS Lisa Scott MA on 7/3/2023 at 8:53 AM    
Self

## 2024-07-26 ENCOUNTER — TELEPHONE (OUTPATIENT)
Dept: NEUROLOGY | Facility: CLINIC | Age: 39
End: 2024-07-26
Payer: COMMERCIAL

## 2024-07-26 NOTE — TELEPHONE ENCOUNTER
PA Initiation    Medication: KESIMPTA 20 MG/0.4ML SC SOAJ  Insurance Company: [facility] Prior Auth Dept, phone  [phone], Fax [phone]  Pharmacy Filling the Rx: [facility] ([facility]) #[id number] - [place], [place] - [address] [facility]  Filling Pharmacy Phone:    Filling Pharmacy Fax:    Start Date: 7/26/2024          Thank you,    Cristy Rivas [facility]  Specialty Pharmacy Clinic Liaison - CardiologyNeurologyMultiple Sclerosis  [facility]  [address] [place] [id number]  Ph: [phone] Fax: [phone]  [email or web address]

## 2024-07-28 ENCOUNTER — HEALTH MAINTENANCE LETTER (OUTPATIENT)
Age: 39
End: 2024-07-28

## 2024-07-29 NOTE — TELEPHONE ENCOUNTER
Prior Authorization Approval    Medication: KESIMPTA 20 MG/0.4ML SC SOAJ  Authorization Effective Date: 7/26/2024  Authorization Expiration Date: 7/26/2025  Approved Dose/Quantity: UD  Reference #:     Insurance Company: [employer] Prior Auth Dept, phone  [phone], Fax [phone]  Expected CoPay: $    CoPay Card Available:      Which Pharmacy is filling the prescription: [facility] ([facility]) #[id number] - [address]

## 2024-08-27 ENCOUNTER — TRANSFERRED RECORDS (OUTPATIENT)
Dept: HEALTH INFORMATION MANAGEMENT | Facility: CLINIC | Age: 39
End: 2024-08-27
Payer: COMMERCIAL

## 2024-09-23 ENCOUNTER — OFFICE VISIT (OUTPATIENT)
Dept: NEUROLOGY | Facility: CLINIC | Age: 39
End: 2024-09-23
Payer: COMMERCIAL

## 2024-09-23 ENCOUNTER — LAB (OUTPATIENT)
Dept: LAB | Facility: CLINIC | Age: 39
End: 2024-09-23
Payer: COMMERCIAL

## 2024-09-23 VITALS — SYSTOLIC BLOOD PRESSURE: 106 MMHG | DIASTOLIC BLOOD PRESSURE: 71 MMHG | HEART RATE: 79 BPM

## 2024-09-23 DIAGNOSIS — G35 MS (MULTIPLE SCLEROSIS) (H): Primary | ICD-10-CM

## 2024-09-23 DIAGNOSIS — G35 MS (MULTIPLE SCLEROSIS) (H): ICD-10-CM

## 2024-09-23 DIAGNOSIS — Z51.81 THERAPEUTIC DRUG MONITORING: ICD-10-CM

## 2024-09-23 LAB
BASOPHILS # BLD AUTO: 0 10E3/UL (ref 0–0.2)
BASOPHILS NFR BLD AUTO: 0 %
CD19 B CELL COMMENT: ABNORMAL
CD19 CELLS # BLD: <1 CELLS/UL (ref 107–698)
CD19 CELLS NFR BLD: <1 % (ref 6–27)
EOSINOPHIL # BLD AUTO: 0 10E3/UL (ref 0–0.7)
EOSINOPHIL NFR BLD AUTO: 0 %
ERYTHROCYTE [DISTWIDTH] IN BLOOD BY AUTOMATED COUNT: 11.9 % (ref 10–15)
HCT VFR BLD AUTO: 39.7 % (ref 35–47)
HGB BLD-MCNC: 13.6 G/DL (ref 11.7–15.7)
IGG SERPL-MCNC: 860 MG/DL (ref 610–1616)
IMM GRANULOCYTES # BLD: 0 10E3/UL
IMM GRANULOCYTES NFR BLD: 0 %
LYMPHOCYTES # BLD AUTO: 1.4 10E3/UL (ref 0.8–5.3)
LYMPHOCYTES NFR BLD AUTO: 20 %
MCH RBC QN AUTO: 30.4 PG (ref 26.5–33)
MCHC RBC AUTO-ENTMCNC: 34.3 G/DL (ref 31.5–36.5)
MCV RBC AUTO: 89 FL (ref 78–100)
MONOCYTES # BLD AUTO: 0.3 10E3/UL (ref 0–1.3)
MONOCYTES NFR BLD AUTO: 4 %
NEUTROPHILS # BLD AUTO: 5.5 10E3/UL (ref 1.6–8.3)
NEUTROPHILS NFR BLD AUTO: 75 %
NRBC # BLD AUTO: 0 10E3/UL
NRBC BLD AUTO-RTO: 0 /100
PLATELET # BLD AUTO: 249 10E3/UL (ref 150–450)
RBC # BLD AUTO: 4.48 10E6/UL (ref 3.8–5.2)
WBC # BLD AUTO: 7.3 10E3/UL (ref 4–11)

## 2024-09-23 PROCEDURE — 85025 COMPLETE CBC W/AUTO DIFF WBC: CPT

## 2024-09-23 PROCEDURE — 86355 B CELLS TOTAL COUNT: CPT

## 2024-09-23 PROCEDURE — 99214 OFFICE O/P EST MOD 30 MIN: CPT | Performed by: PSYCHIATRY & NEUROLOGY

## 2024-09-23 PROCEDURE — 36415 COLL VENOUS BLD VENIPUNCTURE: CPT

## 2024-09-23 PROCEDURE — 82784 ASSAY IGA/IGD/IGG/IGM EACH: CPT

## 2024-09-23 RX ORDER — DIAZEPAM 5 MG
TABLET ORAL
Qty: 2 TABLET | Refills: 0 | Status: SHIPPED | OUTPATIENT
Start: 2024-09-23

## 2024-09-23 NOTE — LETTER
9/23/2024      Rita Hardin  [address]  [place] [address]      Dear Colleague,    Thank you for referring your patient, Rita Hardin, to the [facility]UR[facility]. Please see a copy of my visit note below.    Date of Service: 9/23/2024     [facility] Neurology   MS Clinic Evaluation     Subjective: 38-year-old woman who presents in follow-up for multiple sclerosis.       She did start kesimpta in January.  With the first injection she did experience some tingling around her lips and a couple episodes of diarrhea shortly after the injection.  Some injections since have caused some associated chest pain.  She is having her friend who is a nurse give the injections.  She doesn't like giving it to herself.  She is ok continuing the kesimpta for now.      Since last visit she started noticing some visual changes, specifically dots in her vision intermittently.  She went to an optometrist who then referred to her to an ophthalmologist a few weeks ago who saw possible signs of past episodes of optic neuritis.  She had another episode of spots in her vision yesterday.  It was the first one in several weeks.  She also states that she has intermittent scotomas or migraine auras without head pain that last 20-30 minutes before spontaneously resolving.      She is having systemic symptoms such as intermittent knee pain and elbow that has resolved in the last few weeks.  It has not recurred.  She visited her primary care doctor recently and had a work up for lyme disease and autoimmune disease.  Work up was negative.  She was told she may have fibromyalgia.  The patient is not satisfied with this diagnosis.      She is interested in getting her flu shot and covid shot.  Discussed that she should get them preferably 2 weeks after an injection.     Of note, her father had MS.  He had complications related to Ocrevus that make her nervous.      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 23, sensory myelitis,  tingling hands and lhermittes  Last relapse: age 23    D3 5000 IU daily    Prior DMDs:   Betaseron 2009 to 2010, discontinued for elevated LFTs  Copaxone 5115-3126, discontinued for radiologic progression  Tecfidera February 2014 to 11/2020 generally well tolerated, though does take aspirin to control flushing side effect  Dimethyl fumarate 11/2020 - 7/2023, radiologic progression (growth of lesion at C2)  Kesimpta January 2024 to present    General: no acute distress  Cranial nerves:   VFFC  PERRL w/no RAPD  EOM full w/no ERNESTO   Face symmetric  Hearing intact  No dysarthria   Motor:   Tone is normal   Bulk is normal                           R          L  Deltoid             5          5  Biceps             5          5  Triceps            5          5  Wrist ext          5          5  Finger ext        5          5  Finger abd       5          5     Hip flexion       5          5  Knee flexion    5          5  Knee ext          5          5  Ankle d/f          5          5     Reflexes: 2+ and symmetric throughout, babinski absent bilaterally  Sensory: vibration is minimally reduced in the toes, JPS normal in the toes   Romberg is absent  Coordination: no ataxia or dysmetria  Gait: normal base and stride, tandem gait is intact, able to balance on one foot and hop x 5 bilaterally, able to get up from chair with a single leg     Tests/Imaging:   JEROME virus Ab 0.37  Vitamin D 46    MRI brain   2/2018 - personally reviewed, mild to moderate lesion burden, predominantly periventricular, 1-2 juxtacortical lesions, 1 right cerebellar lesion and one left dorsal midbrain lesion, gd not administered  Generally stable when compared to 2016 & 17 but perhaps slight growth in some of the lesions  11/2020- no new lesions, gd-  12/2021 - no new lesions, gd-   6/2023 - no new lesions    MRI cervical spine   4/2019 - dorsal cord lesions at c2 and c4  11/2020 - no new lesions, gd-  12/2021- no new lesions, gd-   6/2023 - ?growth of  lesion at c2    MRI thoracic spine   12/2019 - chronic lesions R T4 and left dorsal T9-10  11/2020 - no new lesions, gd-  12/2021 - no new lesions, gd-     Assessment: 38-year-old woman with a longstanding history of multiple sclerosis who had evidence of growth of the lesion at C2 while on dimethyl fumarate.  She is now on Kesimpta since January 2024.  She did have side effects and anxiety related to the medication (lip tingling, diarrhea, and chest pain after some prior injections), but is now tolerating ok with a friend administering the medication for her.      Though patient is complaining of some intermittent knee pain and spots in vision, these findings are unlikely to be related to underlying multiple sclerosis.  Low suspicion that her visual symptoms are related to recent optic neuritis.  She has had a diagnosis of MS since 2008 and it is of course possible she developed optic neuritis remotely.  It would be rare to develop new lesions on an effective B cell inhibitor such as kesimtpa.  Patient has been complaint.  Per notes, her recent ophthalmology evaluation was relatively reassuring.  Agree with ophthalmology follow up.  We will obtain a repeat MRI brain and cervical spine to be certain she does not have any new lesions since last imaging in June of 2023.      Patient is tolerating Kesimpta at this time and agrees with continuing for now.  We will obtain repeat blood work today.  Folow up in clinic in 6 months.  She will reach out with any further concerns or questions.        Plan:   - Continue kesimpta  - Blood work today  - MRI brain and cervical spine  - Follow-up in 6 months    AIDA Jeff D.O.  Resident Physician of Neurology  [facility]    Patient discussed with my supervising physician Dr. Whitt, who agrees with the critical findings, assessment, and plan as documented in the note above or as otherwise in their attestation.     Attestation signed by Jose Eduardo,  Teri Edgar MD at 9/26/2024 10:02 PM:  I personally saw and evaluated Ms. Hardin with Dr. Jeff on the date of service.  I have reviewed the above documentation and agree with the findings and recommendations.     A total of 30 minutes were personally spent in the care of this patient on the date of service.     Teri Whitt MD on 9/26/2024 at 10:01 PM      Again, thank you for allowing me to participate in the care of your patient.        Sincerely,        Teri Whitt MD

## 2024-09-23 NOTE — PATIENT INSTRUCTIONS
Continue kesimpta     Blood work today     Mri brain and cervical spine     Follow up in 6 months

## 2024-09-27 LAB — SCANNED LAB RESULT: NORMAL

## 2024-12-26 DIAGNOSIS — G35 MS (MULTIPLE SCLEROSIS) (H): ICD-10-CM

## 2024-12-26 RX ORDER — OFATUMUMAB 20 MG/.4ML
20 INJECTION, SOLUTION SUBCUTANEOUS
Qty: 0.4 ML | Refills: 11 | Status: SHIPPED | OUTPATIENT
Start: 2024-12-26

## 2024-12-26 NOTE — TELEPHONE ENCOUNTER
Pending Prescriptions:                       Disp   Refills    ofatumumab (KESIMPTA) 20 MG/0.4ML injecti*0.4 mL 11           Sig: Inject 0.4 mLs (20 mg) subcutaneously every 28           (twenty-eight) days.    Next appt:    Future Appointments    Encounter Information   Provider Department Center   3/31/2025 10:00 AM (Arrive by 9:45 AM) Teri Whitt MD [facility]       TRICIA Ochoa on 12/26/2024 at 12:27 PM

## 2025-01-19 ENCOUNTER — HEALTH MAINTENANCE LETTER (OUTPATIENT)
Age: 40
End: 2025-01-19